# Patient Record
Sex: FEMALE | Race: WHITE
[De-identification: names, ages, dates, MRNs, and addresses within clinical notes are randomized per-mention and may not be internally consistent; named-entity substitution may affect disease eponyms.]

---
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
## 2022-10-25 ENCOUNTER — OFFICE VISIT (OUTPATIENT)
Dept: ORTHOPEDIC SURGERY | Age: 22
End: 2022-10-25
Payer: COMMERCIAL

## 2022-10-25 VITALS — WEIGHT: 190 LBS | HEIGHT: 69 IN | BODY MASS INDEX: 28.14 KG/M2

## 2022-10-25 DIAGNOSIS — M79.642 LEFT HAND PAIN: Primary | ICD-10-CM

## 2022-10-25 DIAGNOSIS — S62.357A NONDISPLACED FRACTURE OF SHAFT OF FIFTH METACARPAL BONE, LEFT HAND, INITIAL ENCOUNTER FOR CLOSED FRACTURE: ICD-10-CM

## 2022-10-25 PROCEDURE — 99204 OFFICE O/P NEW MOD 45 MIN: CPT | Performed by: ORTHOPAEDIC SURGERY

## 2022-10-25 PROCEDURE — L3807 WHFO W/O JOINTS PRE CST: HCPCS | Performed by: ORTHOPAEDIC SURGERY

## 2022-10-25 NOTE — PROGRESS NOTES
The patient was prescribed and fitted with an EXOS boxer fracture brace for the left hand, size small. She was also prescribed 2 undersleeves for hygiene purposes to wash one/wear one. She was shown how to take the brace off to shower and how to use the BOA tightening device. Patient read and signed documenting they understand and agree to [facility]'s current DME return policy.

## 2022-10-25 NOTE — LETTER
DME Patient Authorization Form    Name: Jeffy Muñoz  : 2000  MRN: [id number]   Age: 23 y.o. Gender: female  Delivery Address: [address] [place] [address] Orthopaedics     Diagnosis:     ICD-10-CM    1. Left hand pain  M79.642 XR HAND LEFT (MIN 3 VIEWS)      2. Nondisplaced fracture of shaft of fifth metacarpal bone, left hand, initial encounter for closed fracture  S62.357A Exos Boxer Fx ()           Requested DME:  Exos Boxer Fx-($267.00) X 1 - left        Clinical Notes:     **Indicates non-covered items by insurance. Payment expected on date of service. Electronically signed by  Provider: Jocelyn Damon MD__Date: 10/25/2022                            [facility]/[address] Tax ID # [id number]        Durable Medical Equipment and/or Orthotics Patient Consent     I understand that my physician has prescribed this medical supply as part of my treatment plan as a matter of Medical Necessity.  I understand that I have a choice in where I receive my prescribed orthopedic supplies and/or services.  I authorize [facility] to furnish this service/product and to provide my insurance carrier with any information requested in order to process for payment.  I instruct my insurance carrier to pay [facility] directly for these services/products.  I understand that my insurance carrier may deny payment for this supply because it is a non-covered item, deemed not medically necessary or considered experimental.   I understand that any cost not covered by my insurance carrier will be solely my financial responsibility.  I have received the Supplier Standards and have reviewed them.  I have received the prescribed item and have been fully instructed on the proper use of the above services/products.    ______ (Patient Initials) I understand that all DME items are non-returnable after being dispensed.  Items still in sealed packaging may be returned up to 14 days after purchasing. [address] will replace items that are defective.    ______ (Patient Initials) I understand that [facility] will not file a claim with my insurance carrier for this service/product and I am waiving my right to file a claim on my own for this service/product with my insurance company as this item is NON-COVERED (Denoted by the **) by my Insurance company/policy. ______ (Patient Initials) I understand that I am responsible to bring my equipment to the hospital for any surgery. ______________________________________________  ________________________  Patient / Uri Balderas            Thank you for considering [address] Your physician has prescribed specific medical equipment or devices for your home use. The following describes your rights and responsibilities as our customer. Right to Choose Providers: You have a choice regarding which company supplies your home medical equipment and devices, and to consult your physician in this decision. You may choose a medical supply store, a home medical equipment provider, or a specialist such as [facility]. [facility] will coordinate with your physician to provide the medical equipment or devices prescribed for your home use. Right to Service:  You have the right to considerate, respectful and nondiscriminatory care. You have the right to receive accurate and easily understood information about your health care. If you speak a foreign language, or don't understand the discussions, assistance will be provided to allow you to make informed health care decisions. You have the right to know your treatment options and to participate in decisions about your care, including the right to accept or refuse treatment.   You have the right to expect a reasonable response to your requests for treatment or service. You have the right to talk in confidence with health care providers and to have your health care information protected. You have the right to receive an explanation of your bill. You have the right to complain about the service or product you receive. Patient Responsibilities:  Please provide complete and accurate information about your health insurance benefits and make arrangements for the timely payment of your bill. [facility] will, if possible, assume responsibility for billing your insurance (Medicare, Medicaid and commercial) for the prescribed equipment or devices. If your policy does not cover the prescribed product, or only covers a portion of the bill, you are responsible for any remaining balance. Return and Exchange Policy:  [facility] will honor published  Warranties for products. [facility] will accept returns or exchanges within 14 days from the date of receipt, providin) the product must be in new condition; 2) receipt as required; and 3) used disposable and hygiene products may only be returned due to a defective product. Note: Refunds will be issued in a timely manner, please allow 4-6 weeks for processing. Complaint Procedures and DME Consumer Protection Resources:  [facility] values you as a customer, and is committed to resolving patient concerns. This commitment includes understanding and documenting your concerns, conducting a review of internal procedures, and providing you with an explanation and resolution to your concerns. Should you have any questions about our services or billing process, please contact our office at (practice phone number). If we are unable to resolve the concern, you have the right to direct comments to the office of Consumer Protection, in the [address] or the [employer]  office, without fear of repercussion.     [address]    A supplier must be in compliance with all applicable Federal and State licensure and regulatory requirements. A supplier must provide complete and accurate information on the DMEPOS supplier application. Any changes to this information must be reported to the [facility] within 30 days. An authorized individual (one whose signature is binding) must sign the application for billing privileges. A supplier must fill orders from its own inventory, or must contract with other companies for the purchase of items necessary to fill the order. A supplier may not contract with any entity that is currently excluded from the Medicare program, any [facility] program, or from any other Federal procurement or Nonprocurement programs. A supplier must advise beneficiaries that they may rent or purchase inexpensive or routinely purchased durable medical equipment, and of the purchase option for capped rental equipment. A supplier must notify beneficiaries of warranty coverage and honor all warranties under applicable State Law, and repair or replace free of charge Medicare covered items that are under warranty. A supplier must maintain a physical facility on an appropriate site. A supplier must permit CMS, or its agents to conduct on-site inspections to ascertain the supplier's compliance with these standards. The supplier location must be accessible to beneficiaries during reasonable business hours, and must maintain a visible sign and posted hours of operation. A supplier must maintain a primary business telephone listed under the name of the business in a [employer] or a toll free number available through directory assistance. The exclusive use of a beeper, answering machine or cell phone is prohibited. A supplier must have comprehensive liability insurance in the amount of at least $300,000 that covers both the supplier's place of business and all customers and employees of the supplier.   If the supplier manufactures its own items, this insurance must also cover product liability and completed operations. A supplier must agree not to initiate telephone contact with beneficiaries, with a few exceptions allowed. This standard prohibits suppliers from calling beneficiaries in order to solicit new business. A supplier is responsible for delivery and must instruct beneficiaries on use of Medicare covered items, and maintain proof of delivery. A supplier must answer questions, and respond to complaints of the beneficiaries, and maintain documentation of such contacts. A supplier must maintain and replace at no charge or repair directly, or through a service contract with another company, Medicare covered items it has rented to beneficiaries. A supplier must accept returns of substandard (less than full quality for the particular item) or unsuitable items (inappropriate for the beneficiary at the time it was fitted and rented or sold) from beneficiaries. A supplier must disclose these supplier standards to each beneficiary to whom it supplies a Medicare-covered item. A supplier must disclose to the government any person having ownership, financial, or control interest in the supplier. A supplier must not convey or reassign a supplier number; i.e., the supplier may not sell or allow another entity to use its Medicare billing number. A supplier must have a complaint resolution protocol established to address beneficiary complaints that relate to these standards. A record of these complaints must be maintained at the physical facility. Complaint records must include: the name, address, telephone number and health insurance claim number of the beneficiary, a summary of the complaint, and any action taken to resolve it. A supplier must agree to furnish CMS any information required by the Medicare statute and implementing regulations.   A supplier of DMEPOS and other items and services must be accredited by a CMS-approved accreditation organization in order to receive and retain a supplier billing number. The accreditation must indicate the specific products and services, for which the supplier is accredited in order for the supplier to receive payment for those specific products and services. A DMEPOS supplier must notify their accreditation organization when a new DMEPOS location is opened. The accreditation organization may accredit the new supplier location for three months after it is operational without requiring a new site visit. All DMEPOS supplier locations, whether owned or subcontracted, must meet the [employer] and be separately accredited in order to bill Medicare. An accredited supplier may be denied enrollment or their enrollment may be revoked, if CMS determines that they are not in compliance with the DMEPOS quality standards. A DMEPOS supplier must disclose upon enrollment all products and services, including the addition of new product lines for which they are seeking accreditation. If a new product line is added after enrollment, the DMEPOS supplier will be responsible for notifying the accrediting body of the new product so that the DMEPOS supplier can be re-surveyed and accredited for these new products. Must meet the surety bond requirements specified in 42 C. F.R. 424.57(c). Implementation date- May 4, 2009. A supplier must obtain oxygen from a state-licensed oxygen supplier. A supplier must maintain ordering and referring documentation consistent with provisions found in 42 C. F.R. 424.516(f). DMEPOS suppliers are prohibited from sharing a practice location with certain other Medicare providers and suppliers. DMEPOS suppliers must remain open to the public for a minimum of 30 hours per week with certain exceptions.

## 2022-10-25 NOTE — PROGRESS NOTES
Orthopaedic Hand Clinic Note    Name: Jeffy Muñoz  YOB: 2000  Gender: female  MRN: [id number]      CC: Patient referred for evaluation of upper extremity pain    HPI: Jeffy Muñoz is a 23 y.o. female with a chief complaint of left hand pain after injuring it during basketball practice 3 days ago. She says it hurts to put pressure on the ring and small fingers. She has not had any x-rays obtained. Denies any numbness or tingling. ROS/Meds/PSH/PMH/FH/SH: I personally reviewed the patients standard intake form. Pertinents are discussed in the HPI    Physical Examination:    Musculoskeletal Exam:  Examination on the left upper extremity demonstrates cap refill < 5 seconds in all fingers, there is swelling ecchymosis and tenderness to palpation along the fifth metacarpal.  There is no obvious deformity. She is able to perform full extension of the small finger MCP PIP and DIP joints, and is able to make a composite fist to the distal palmar crease, with no malrotation of the small finger upon making a fist.  Light Touch sensation is intact throughout. Imaging / Electrodiagnostic Tests:     Hand XR: AP, Lateral, Oblique     Clinical Indication:  1. Left hand pain    2. Nondisplaced fracture of shaft of fifth metacarpal bone, left hand, initial encounter for closed fracture           Report: AP, lateral, and oblique x-ray of the left hand demonstrates minimally displaced fracture of fifth metacarpal shaft    Impression: as above     Jocelyn Damon MD         Assessment:     ICD-10-CM    1. Left hand pain  M79.642 XR HAND LEFT (MIN 3 VIEWS)      2. Nondisplaced fracture of shaft of fifth metacarpal bone, left hand, initial encounter for closed fracture  S62.357A Exos Boxer Fx ()     Exos Boxer Fx ()          Plan:   We discussed the diagnosis and different treatment options.  We discussed observation, therapy, antiinflammatory medications and other pertinent treatment modalities. After discussing in detail the patient elects to proceed with nonsurgical treatment with placement into a Exos boxer's fracture brace. Brace to remain in place at all times set for hygiene. She can participate in basketball practice and games with the brace on if she is able. I will see her back in 2 weeks for repeat radiographs. Patient voiced accordance and understanding of the information provided and the formulated plan. All questions were answered to the patient's satisfaction during the encounter.       Jocelyn Damon MD  Orthopaedic Surgery  10/25/22  1:38 PM

## 2022-11-08 ENCOUNTER — OFFICE VISIT (OUTPATIENT)
Dept: ORTHOPEDIC SURGERY | Age: 22
End: 2022-11-08
Payer: COMMERCIAL

## 2022-11-08 DIAGNOSIS — S62.357A NONDISPLACED FRACTURE OF SHAFT OF FIFTH METACARPAL BONE, LEFT HAND, INITIAL ENCOUNTER FOR CLOSED FRACTURE: Primary | ICD-10-CM

## 2022-11-08 PROCEDURE — 99213 OFFICE O/P EST LOW 20 MIN: CPT | Performed by: ORTHOPAEDIC SURGERY

## 2022-11-08 NOTE — PROGRESS NOTES
Orthopaedic Hand Clinic Note    Name: Sona Bennett  YOB: 2000  Gender: female  MRN: [id number]      Follow up visit:   1. Nondisplaced fracture of shaft of fifth metacarpal bone, left hand, initial encounter for closed fracture        HPI: Sona Bennett is a 23 y.o. female who is following up for her left fifth metacarpal fracture. She has been compliant with the use of her brace. ROS/Meds/PSH/PMH/FH/SH: I personally reviewed the patients standard intake form. Pertinents are discussed in the HPI    Physical Examination:    Musculoskeletal Examination:  Examination on the left upper extremity demonstrates cap refill < 5 seconds in all fingers, there is swelling ecchymosis and tenderness to palpation along the fifth metacarpal.  There is no obvious deformity. She is able to perform full extension of the small finger MCP PIP and DIP joints, and is able to make a composite fist to the distal palmar crease, with no malrotation of the small finger upon making a fist.  Light Touch sensation is intact throughout. Imaging / Electrodiagnostic Tests:     Hand XR: AP, Lateral, Oblique     Clinical Indication:  1. Nondisplaced fracture of shaft of fifth metacarpal bone, left hand, initial encounter for closed fracture           Report: AP, lateral, and oblique x-ray of the left hand demonstrates minimally displaced fracture of fifth metacarpal shaft    Impression: as above     Shailesh De León MD         Assessment:     ICD-10-CM    1. Nondisplaced fracture of shaft of fifth metacarpal bone, left hand, initial encounter for closed fracture  S62.357A XR HAND LEFT (MIN 3 VIEWS)          Plan:   We discussed the diagnosis and different treatment options. We discussed observation, therapy, antiinflammatory medications and other pertinent treatment modalities. After discussing in detail the patient elects to proceed with continued use of her Exos brace.   She can remove it for hygiene and gentle wrist and finger range of motion. I will see her back in 2 weeks for repeat radiographs. If there is enough callus formation I may consider transitioning her to a wrist lacer. Patient voiced accordance and understanding of the information provided and the formulated plan. All questions were answered to the patient's satisfaction during the encounter.       Lenny Haley MD  Orthopaedic Surgery  11/08/22  10:40 AM

## 2022-11-28 ENCOUNTER — OFFICE VISIT (OUTPATIENT)
Dept: ORTHOPEDIC SURGERY | Age: 22
End: 2022-11-28
Payer: COMMERCIAL

## 2022-11-28 DIAGNOSIS — S62.357A NONDISPLACED FRACTURE OF SHAFT OF FIFTH METACARPAL BONE, LEFT HAND, INITIAL ENCOUNTER FOR CLOSED FRACTURE: Primary | ICD-10-CM

## 2022-11-28 PROCEDURE — 99213 OFFICE O/P EST LOW 20 MIN: CPT | Performed by: ORTHOPAEDIC SURGERY

## 2022-11-28 NOTE — PROGRESS NOTES
Orthopaedic Hand Clinic Note    Name: Diego Taylor  YOB: 2000  Gender: female  MRN: [id number]      Follow up visit:   1. Nondisplaced fracture of shaft of fifth metacarpal bone, left hand, initial encounter for closed fracture        HPI: Diego Taylor is a 23 y.o. female who is following up for her left fifth metacarpal fracture. She has been compliant with the use of her brace. ROS/Meds/PSH/PMH/FH/SH: I personally reviewed the patients standard intake form. Pertinents are discussed in the HPI    Physical Examination:    Musculoskeletal Examination:  Examination on the left upper extremity demonstrates cap refill < 5 seconds in all fingers, there is no tenderness to palpation along the fifth metacarpal.  There is no obvious deformity. She is able to perform full extension of the small finger MCP PIP and DIP joints, and is able to make a composite fist to the distal palmar crease, with no malrotation of the small finger upon making a fist.  Light Touch sensation is intact throughout. Imaging / Electrodiagnostic Tests:     Hand XR: AP, Lateral, Oblique     Clinical Indication:  1. Nondisplaced fracture of shaft of fifth metacarpal bone, left hand, initial encounter for closed fracture           Report: AP, lateral, and oblique x-ray of the left hand demonstrates minimally displaced fracture of fifth metacarpal shaft, now with increased callus formation    Impression: as above     Mauricio Guzman MD         Assessment:     ICD-10-CM    1. Nondisplaced fracture of shaft of fifth metacarpal bone, left hand, initial encounter for closed fracture  S62.357A XR HAND LEFT (MIN 3 VIEWS)          Plan:   We discussed the diagnosis and different treatment options. We discussed observation, therapy, antiinflammatory medications and other pertinent treatment modalities. She can wean out of her Exos brace, and I will allow her to participate in basketball practice this week.  As long as she is able to participate in practice without pain , she can participate in games beginning Saturday. I will see her in 4 weeks for repeat radiographs    Patient voiced accordance and understanding of the information provided and the formulated plan. All questions were answered to the patient's satisfaction during the encounter.       Byron Huntley MD  Orthopaedic Surgery  11/28/22  4:14 PM

## 2023-04-20 ENCOUNTER — TREATMENT (OUTPATIENT)
Age: 23
End: 2023-04-20

## 2023-04-20 DIAGNOSIS — M25.562 CHRONIC PAIN OF LEFT KNEE: ICD-10-CM

## 2023-04-20 DIAGNOSIS — G89.29 CHRONIC PAIN OF LEFT KNEE: ICD-10-CM

## 2023-04-20 DIAGNOSIS — M25.662 KNEE STIFFNESS, LEFT: Primary | ICD-10-CM

## 2023-04-20 NOTE — PROGRESS NOTES
for 30 Day(s)  Interventions may include but are not limited to: (75721) Therapeutic exercise to develop ROM, strength, endurance and flexibility  (16820) Therapeutic activities using dynamic activities to improve function  (00222) Manual therapy techniques to improve joint and/or soft tissue mobility, ROM, and function as well as helping to decrease pain/spasms and swelling  (57020) Neuromuscular reeducation addressing impaired balance, coordination, kinesthetic sense, posture and proprioception  (83156/66628) Dry needling for the management of neuromusculoskeletal pain and movement impairment. Dry needling is utilized as pt has not had appropriate response to other modalities for symptom control and restoring ROM w/ tx  Home exercise program (HEP) development    The referring physician has reviewed and approved this evaluation and plan of care as noted by the electronic signature attached to note.     GOALS     Short term goals to be met by 4/27/2023 (2 weeks):  Patient will improve L prone quad FLEX to 135 deg for improved jumping  Pt will report pain free climbing stairs  Pt will report less than 3/10 w/ jumping    Long term goals to be met by 5/11/2023  (4 weeks):   Pt will improve IKDC by 10%  Pt will demonstrate L quad HHD within 95% of R quad for jumping  Pt will report mild limitation w/ sport participation    Riddhi Alberto

## 2023-04-26 ENCOUNTER — TREATMENT (OUTPATIENT)
Age: 23
End: 2023-04-26
Payer: COMMERCIAL

## 2023-04-26 DIAGNOSIS — G89.29 CHRONIC PAIN OF LEFT KNEE: ICD-10-CM

## 2023-04-26 DIAGNOSIS — M25.562 CHRONIC PAIN OF LEFT KNEE: ICD-10-CM

## 2023-04-26 DIAGNOSIS — M25.662 KNEE STIFFNESS, LEFT: Primary | ICD-10-CM

## 2023-04-26 PROCEDURE — 97140 MANUAL THERAPY 1/> REGIONS: CPT | Performed by: PHYSICAL THERAPIST

## 2023-04-26 PROCEDURE — 97110 THERAPEUTIC EXERCISES: CPT | Performed by: PHYSICAL THERAPIST

## 2023-04-26 NOTE — PROGRESS NOTES
strengthening      Effective Dates: 4/13/2023 TO 5/13/2023 (30 days). Frequency/Duration: 1x/week for 30 Day(s)  Interventions may include but are not limited to: (23493) Therapeutic exercise to develop ROM, strength, endurance and flexibility  (24886) Therapeutic activities using dynamic activities to improve function  (80911) Manual therapy techniques to improve joint and/or soft tissue mobility, ROM, and function as well as helping to decrease pain/spasms and swelling  (06832) Neuromuscular reeducation addressing impaired balance, coordination, kinesthetic sense, posture and proprioception  (13662/25642) Dry needling for the management of neuromusculoskeletal pain and movement impairment. Dry needling is utilized as pt has not had appropriate response to other modalities for symptom control and restoring ROM w/ tx  Home exercise program (HEP) development    The referring physician has reviewed and approved this evaluation and plan of care as noted by the electronic signature attached to note.     GOALS     Short term goals to be met by 4/27/2023 (2 weeks):  Patient will improve L prone quad FLEX to 135 deg for improved jumping  Pt will report pain free climbing stairs  Pt will report less than 3/10 w/ jumping    Long term goals to be met by 5/11/2023  (4 weeks):   Pt will improve IKDC by 10%  Pt will demonstrate L quad HHD within 95% of R quad for jumping  Pt will report mild limitation w/ sport participation    Hermelinda Thao

## 2023-05-04 ENCOUNTER — TREATMENT (OUTPATIENT)
Age: 23
End: 2023-05-04

## 2023-05-04 DIAGNOSIS — M62.81 MUSCLE WEAKNESS: Primary | ICD-10-CM

## 2023-05-04 DIAGNOSIS — G89.29 CHRONIC PAIN OF LEFT KNEE: ICD-10-CM

## 2023-05-04 DIAGNOSIS — M25.662 KNEE STIFFNESS, LEFT: ICD-10-CM

## 2023-05-04 DIAGNOSIS — M25.562 CHRONIC PAIN OF LEFT KNEE: ICD-10-CM

## 2023-05-04 NOTE — PROGRESS NOTES
[facility] [address] [address]  Dept: [phone]      Physical Therapy Discharge     Referring MD: Lula Lincoln  Diagnosis:     ICD-10-CM    1. Muscle weakness  M62.81       2. Chronic pain of left knee  M25.562     G89.29       3. Knee stiffness, left  M25.662            Therapy precautions:None  Co-morbidities affecting plan of care: none  Total Time: 45 min, Timed Procedure Codes: 45 min  Modifier needed: No  Visit count:   4       SUBJECTIVE     Chief complaints/history of injury:    Date symptoms began: 2/1/22  Lalo Nagy of condition: Chronic (continuous duration > 3 months)  Primary cause of current episode: Unspecified  How did symptoms start: Pt reports during last season pain began and she has noticed a gradual knot in distal L quad. She reports teri knee pain w/ sport but her L knee has started to bother her more recently. Her pain is in patella tendon w/ basketball activity but she feels like the distal quad tissue is affecting ability to use L leg. She does not feel that pain is limiting her activity and w/ inc playing her symptoms will worsen. She has had previous tx w/  from w/ little change in tissue. She has been doing self STM w/ no change. Since season ended her general pain has improved but the knot in tissue has been unchanged. No imaging performed on L leg. She feels symptoms worse w/ climbing stairs.   PMH: R tibial stress fx 2/2022, R hamstring strain/tear 9/2022    Today's subjective:   Pt reports her knee has been feeling better; she still notices the lump but has not had trouble with stairs recently and was able to do her lifting and max outs at gym w/ no pain      OBJECTIVE      IKDC Score: 46 / 87 = 52.9 %    A/PROM Measures:    Right Left Comment   Hip Flexion      Hip Abduction      Hip IR      Hip ER      Knee Extension -8 -5    Knee Flexion 135 136   Prone knee FLEX: R 135; L 135   Hip [facility] Hip ER limited teri

## 2023-10-03 ENCOUNTER — OFFICE VISIT (OUTPATIENT)
Dept: ENT CLINIC | Age: 23
End: 2023-10-03
Payer: COMMERCIAL

## 2023-10-03 VITALS — HEIGHT: 69 IN | OXYGEN SATURATION: 99 % | HEART RATE: 79 BPM | BODY MASS INDEX: 26.45 KG/M2 | WEIGHT: 178.6 LBS

## 2023-10-03 DIAGNOSIS — J34.89 NASAL OBSTRUCTION: ICD-10-CM

## 2023-10-03 DIAGNOSIS — J30.9 ALLERGIC RHINITIS, UNSPECIFIED SEASONALITY, UNSPECIFIED TRIGGER: Primary | ICD-10-CM

## 2023-10-03 DIAGNOSIS — S09.92XA INJURY OF NOSE, INITIAL ENCOUNTER: ICD-10-CM

## 2023-10-03 PROCEDURE — 99243 OFF/OP CNSLTJ NEW/EST LOW 30: CPT | Performed by: STUDENT IN AN ORGANIZED HEALTH CARE EDUCATION/TRAINING PROGRAM

## 2023-10-03 RX ORDER — MOMETASONE FUROATE 50 UG/1
2 SPRAY, METERED NASAL 2 TIMES DAILY
Qty: 2 EACH | Refills: 5 | Status: SHIPPED | OUTPATIENT
Start: 2023-10-03

## 2023-10-03 ASSESSMENT — ENCOUNTER SYMPTOMS
SHORTNESS OF BREATH: 0
VOMITING: 0
EYE ITCHING: 0
EYE REDNESS: 0